# Patient Record
Sex: FEMALE | Race: WHITE | NOT HISPANIC OR LATINO | URBAN - METROPOLITAN AREA
[De-identification: names, ages, dates, MRNs, and addresses within clinical notes are randomized per-mention and may not be internally consistent; named-entity substitution may affect disease eponyms.]

---
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
## 2025-01-31 NOTE — PATIENT INSTRUCTIONS
Patient Education     Lowering Your Risk of Breast Cancer   About this topic   Breast cancer is a serious illness. Breast cancer is when abnormal cells grow and divide more quickly in your breast. These cells form a growth or tumor. The abnormal cells may enter nearby tissue and spread to other parts of the body. It is the type of cancer most often seen in women. Men can have breast cancer, but it is a rare condition.  General   Some things in your life may increase your risk of breast cancer. You may not be able to change some of these. Others you can control.  You are more likely to get breast cancer if you:  Have a mother, sister, or daughter who has had breast cancer  Have used hormones for menopause for more than 5 years  Have had radiation therapy to the breast or chest in the past  Are overweight or do not exercise  Had your first menstrual period before you were 11 years old  Went through menopause after age 55  Have never been pregnant or had your first child after age 35  Have had breast cancer before  Drink alcohol in any form  Have dense breasts  Are older in age  There is no certain way to prevent breast cancer. There are things you can do to lower your chances of having breast cancer.  Keep a healthy weight. Lose weight if you are overweight. Being overweight raises your chances of having breast cancer.  Eat a healthy diet to maintain a healthy weight, such as more fruits, vegetables, and lean cuts of meat. Decrease the amount of saturated fat in your diet.  Exercise. Being active helps you keep a healthy weight.  Limit your alcohol intake or do not drink alcohol. The more alcohol you drink, the higher your risk.  Do not smoke cigarettes. Smoking can increase your risk of many types of cancer.  Breastfeed your baby. This may help protect you. The longer you breastfeed, the more protection you have.  Talk with your doctor about:  Limiting or stopping hormone therapy.  Taking certain drugs to prevent  breast cancer. For women at high risk of having breast cancer, there are a few drugs that may lower your risk.  Surgery to prevent you from having breast cancer if you are very high risk.  When do I need to call the doctor?   Changes in your breasts  A lump or area in your breast that feels different  Discharge from your nipple  Skin on your breast is dimpled or indented  You have questions or concerns about your breasts  Helpful tips   Talk to your doctor about the best kind of breast cancer screening for you.  If you want to do self breast exams, have your doctor show you the right way to do them.  Tell your doctor of any abnormal finding.  Last Reviewed Date   2021-10-04  Consumer Information Use and Disclaimer   This generalized information is a limited summary of diagnosis, treatment, and/or medication information. It is not meant to be comprehensive and should be used as a tool to help the user understand and/or assess potential diagnostic and treatment options. It does NOT include all information about conditions, treatments, medications, side effects, or risks that may apply to a specific patient. It is not intended to be medical advice or a substitute for the medical advice, diagnosis, or treatment of a health care provider based on the health care provider's examination and assessment of a patient’s specific and unique circumstances. Patients must speak with a health care provider for complete information about their health, medical questions, and treatment options, including any risks or benefits regarding use of medications. This information does not endorse any treatments or medications as safe, effective, or approved for treating a specific patient. [employer] and its affiliates disclaim any warranty or liability relating to this information or the use thereof. The use of this information is governed by the Terms of Use, available at  https://www.[employer].com/en/know/clinical-effectiveness-terms   Copyright   Copyright © 2024 [employer] and its affiliates and/or licensors. All rights reserved.       Perineal Hygiene      Your vaginal naturally takes care of its self, it is a self washing system, the less you mess the healthier it will be     No soaps or feminine wash to the vulva, these products can cause dermitis, bacterial infections and other vulvar problems.   Use only water to cleanse, or water with Dove or Dove Sensitive Skin Bar soap if necessary.    Avoid the use of washcloths, exfoliating allison's, netted scrubbers, loofa's, use your hands only to cleanse the vulvar tissues    No scented lotions or products are advised in or near your vulva.    Use coconut oil in its solid form for moisture if needed.  No douching this may cause imbalance in your vaginal PH and further issues.    If you wear panty liners, you may apply a thin coating of Vaseline, A&D ointment or coconut oil to the vulvar tissues as a skin barrier     Cotton underware, loose fitting clothing  Only perfume-free, dye-free laundry detergent, use a second rinse cycle   Avoid fabric softeners/dryer sheets.       Your partner should avoid the same products as well.       Over the counter probiotic to restore vaginal jalil may be helpful as well, take daily.       You may also look into Boric Acid vaginal suppositories to restore vaginal PH balance for up to 2 weeks as directed on the box. You may not use these if you are pregnant      For vaginal dryness:      You may use:     Coconut oil in solid form, not liquid (organic, pure, unscented) as needed for moisture or lubrication. ( Do not use if allergic)       Replens moisture restore external comfort gel daily ( use as directed on the box)        Replens long lasting vaginal moisturizer  ( use as directed on the box)     May try Frockadvisor vulvar moisturizer ( found on Amazon )    May try Revaree vaginal inserts        For  Vaginal Lubrication:          You may use:     Coconut oil in solid form, not liquid (organic, pure, unscented) as a lubricant or another scent-free lubricant (Astroglide, Uberlube) if needed.  Do not use coconut oil or silicone if using a condom as this may break down the integrity of the condom and cause an unplanned pregnancy              Do not use coconut oil if allergic               Replens silky smooth lubricant, premium silicone based lubricant for intercourse. ( use as directed, a small amount will provide an enhanced natural feeling)     Any premium over the counter vaginal lubricant water or silicone based. Silicone based will have more staying power.        For Vulvar irritation/itching:        You may use Hydrocortisone 1 % over the counter to external vulvar tissues 2 x daily for 5-7 days to help with irriation and itch relief.  Birth Control Pills     Birth control pills  are also called oral contraceptives, or the pill. It is medicine that helps prevent pregnancy by stopping ovulation. Ovulation is when the ovaries make and release an egg cell each month. If this egg gets fertilized by sperm, pregnancy occurs. You will need to take the pill at the same time every day. Your healthcare provider will tell you when to start taking the pill. You will also be told what to do if you miss a dose. Instructions will depend on the kind of birth control pills you are taking.   Different kinds of birth control pills:  Some kinds are taken for 21 days in a row, followed by 7 days of placebo (no hormones) pills. Other kinds are taken for 24 days followed by 4 days of placebos. Each kind has a certain amount of female hormones. Your provider will decide on the kind that is best for you based on your age and other health conditions.  Call your local emergency number (911 in the [place]) for any of the following:   You have any of the following signs of a stroke:      Numbness or drooping on one side of your face      Weakness in an arm or leg    Confusion or difficulty speaking    Dizziness, a severe headache, or vision loss    You feel lightheaded, short of breath, and have chest pain.    You cough up blood.    Seek care immediately if:   Your arm or leg feels warm, tender, and painful. It may look swollen and red.    You have severe pain, numbness, or swelling in your arms or legs.    Contact your healthcare provider if:   You have forgotten to take a birth control pill.    You have mood changes, such as depression, since starting birth control pills.    You have nausea or are vomiting.    You have severe abdominal pain.    You missed a period and have questions or concerns about being pregnant.    You still have bleeding 4 months after taking birth control pills correctly.    You have questions or concerns about your condition or care.    What may be done before you can start taking birth control pills:  You need to see your healthcare provider to get a prescription. Any of the following may be done before your healthcare provider gives you a prescription:  Your healthcare provider will ask about diseases and illnesses you have had in the past. Your provider will check your risk for blood clots, heart conditions, or stroke. Tell your provider if you had gastric bypass surgery. This surgery can affect the way your body absorbs medicines such as birth control pills.    Your provider will also check your blood pressure, and may do a breast and pelvic exam. A Pap smear may also be done during the pelvic exam. This is a test to make sure you do not have abnormal changes on your cervix. You may need other tests, such as a urine test to make sure you are not pregnant.    Your provider will ask if you take any medicines and if you smoke. Smoking increases your risk for stroke, heart attack, or a blood clot in your lungs. If you smoke, you should not take certain kinds of birth control pills.    Advantages of birth control pills:   When birth control pills are used correctly, the chances of getting pregnant are very low. Birth control pills may help decrease bleeding and pain during your monthly period. They may also help prevent cancer of the uterus and ovaries.  Disadvantages of birth control pills:  You may have sudden changes in your mood or feelings while you take birth control pills. You may have nausea and a decreased sex drive. You may have an increased appetite and rapid weight gain. You may also have bleeding in between periods, less frequent periods, vaginal dryness, and breast pain. Birth control pills will not protect you from sexually transmitted infections. Rarely, some birth control pills can increase your risk for a blood clot. This may become life-threatening.  If you decide you want to get pregnant:  If you are planning to have a baby, ask your healthcare provider when you may stop taking your birth control pills. It may take some time for you to start ovulating again. Ask your healthcare provider for more information about pregnancy after birth control pills.  When to start taking birth control pills after you have a baby:  If you are not breastfeeding, you may start taking birth control pills 3 weeks after you give birth. You may be able to take certain types of birth control pills if you are breastfeeding. These pills can be started from 6 weeks to 6 months after you give birth. Ask your healthcare provider for more information about when to start taking birth control pills after you give birth.  What you need to know about birth control pills and menopause:   Talk with your healthcare provider if you want to take birth control pills around menopause.     Around age 45, you will enter into perimenopause. This means your hormone levels are dropping and you are ovulating less often. You can still become pregnant during this time. The risk for problems, such as miscarriage, are higher if you become pregnant after age 45.  Birth control pills will prevent pregnancy, and may also help prevent or relieve some signs and symptoms of menopause. Examples are hot flashes and mood swings.     Your provider will do tests when you are around age 50. The tests may show that you are in menopause. If the tests do not show menopause for sure, you may be able to continue taking the pill up to age 55. The decision will depend on your health and if you have any medical conditions, such as a blood clot.    Do not smoke:  Nicotine and other chemicals in cigarettes and cigars increase your risk for a blood clot while you use birth control pills. The risk is higher if you are also 35 or older. Ask your healthcare provider for information if you currently smoke and need help to quit. E-cigarettes or smokeless tobacco still contain nicotine. Talk to your healthcare provider before you use these products.  Follow up with your healthcare provider as directed:  Write down your questions so you remember to ask them during your visits.  © Copyright [employer] 2020 Information is for End User's use only and may not be sold, redistributed or otherwise used for commercial purposes. All illustrations and images included in CareNotes® are the copyrighted property of [employer]. or [employer]  The above information is an  only. It is not intended as medical advice for individual conditions or treatments. Talk to your doctor, nurse or pharmacist before following any medical regimen to see if it is safe and effective for you.     Patient Education     Being transgender   The Basics   Written by the doctors and editors at [facility]   What are sex and gender identity? -- This can be confusing. The terms do not mean exactly the same thing:   Your sex refers to your body's biology. It includes all of the things that are related to reproduction: your genitals, reproductive organs, sex chromosomes, sex hormones, and certain genes. The term  "\"birth-recorded sex\" is used to mean a person's sex when they are born, based on all of these things.   Your gender identity is based on how you feel. It is your sense of being a man, woman, a combination, or something else. Many people whose birth-recorded sex is male identify as men, and many people whose birth-recorded sex is female identify as women. This is called being \"cisgender.\" But not everyone's gender identity lines up with their birth-recorded sex in this way.  What does it mean to be transgender? -- \"Transgender,\" or \"trans,\" is a word some people use to describe themselves when their gender identity is different from their birth-recorded sex.   A transgender woman is a person whose birth-recorded sex is male, but who identifies as a woman.   A transgender man is a person whose birth-recorded sex is female, but who identifies as a man.   Some people do not identify as being either a man or a woman, or identify as being somewhere between the 2. They might prefer another term like \"nonbinary,\" \"genderqueer,\" or \"genderfluid\" to describe themselves. There are many other terms people use to describe their gender identity, too.  Many transgender people want their appearance or \"gender expression\" to match their gender identity, although this is not always the case. Transgender people might choose to take medicines and/or have surgery to change their bodies to better reflect their gender identity. Some people also choose to change the pronouns they use, like \"he,\" \"she,\" or \"they.\"  Gender identity is not the same as sexual orientation. Sexual orientation refers to your physical or sexual attraction to other people. No matter what their gender identity is, a person could be attracted to people of the same sex, different sex, neither, or both.  How do I know if I am transgender? -- Transgender people feel that their gender identity does not match their birth-recorded sex. Doctors sometimes use the term " "\"gender incongruence\" for this. Some use the term \"gender dysphoria\" when this feeling causes distress or discomfort.  Many transgender adults felt this way as children, even before they went through puberty. Puberty is the term for the changes the body goes through during the preteen and teen years.  If you think that you might be transgender, a doctor, nurse, or therapist can help you understand what this means. It's a good idea to talk with a doctor or other professional who has experience working with transgender people. They can talk to you about your options and help you get the support you need.  If you are feeling very sad or anxious, tell a doctor or nurse right away. There are treatments that can help.  What if I think that my child might be transgender? -- If your child shows signs that their gender identity might not match their birth-recorded sex, you can help by being accepting and supportive. Some children and young teens might express a different gender identity in some ways, or for a short time, but not actually be transgender. Others are indeed transgender. The best thing you can do is make it clear that you support your child no matter how they identify.  Working with doctors and other professionals who understand transgender issues can help both you and your child. Remember that being transgender is not a mental illness and that every person is different. For example, some children might want to change the way they dress or use a new name or pronouns, while others do not.  What is gender-affirming care? -- This refers to health care that is meant to help a person align their body with their gender identity in the way that feels best to them. It might include therapy, medicines, or surgery. Different people choose different treatments.  For children or teens, doctors can prescribe medicines that stop or delay puberty. This might be an option if a child is starting puberty and thinks that they " "might be transgender. Stopping the body changes that happen during puberty can give the child more time to figure out if they are truly transgender. If not, they can stop taking the medicines and go through puberty as usual. If they are indeed transgender, they can instead choose to start hormone treatment. This usually happens around age 16, but can be earlier in some cases. For people who also choose to have surgery, doctors often recommend waiting until age 18.  Gender-affirming care should involve doctors, nurses, and others who have experience working with transgender people. It might include:   Therapy with a counselor - This involves talking with an expert like a psychiatrist, psychologist, nurse, or . Therapy can help you understand your gender identity and figure out what changes you might want to make. They might also have you speak with other transgender people, so you can talk to someone who has gone through something similar. This can help you get a better idea of what you might experience as your body changes with hormone treatment and with surgery, if you choose these treatments.   Social transition - This means living according to your gender identity, both at home and in public. This might include wearing clothes or hair styles often associated with this gender identity. It might also include using a name and pronouns (like \"he,\" \"she,\" or \"they\") that fit with your gender identity. If you or your child wants to try a social transition, talk with a counselor or other expert. They can help you figure out how to do this.   Hormone therapy - These include both sex hormones and medicines that block sex hormones. They are taken as pills, injections (shots), or gels you rub on your skin.  Sex hormones cause changes in the sexual organs and other parts of the body. Adding certain hormones, or using medicine to block other hormones, can cause changes in the body. Male hormones are called " "\"androgens,\" and female hormones are called \"estrogens.\"   Transgender women - Transgender women (whose birth-recorded sex is male) can take medicines that block androgens. They can also take estrogen. Both of these things can help lessen body hair and make the body appear more feminine.   Transgender men - Transgender men (whose birth-recorded sex is female) can take androgens. The main androgen that people take is testosterone. Testosterone causes hair to grow on the face and increases muscle. It also makes the clitoris grow larger (figure 1). (The clitoris is the structure above the vagina that gives sexual pleasure.) In addition to making the body look more masculine, testosterone also eventually stops monthly periods.   Surgery - Not all transgender people choose to have surgery. But some do. There are several different surgery options for different parts of the body. These are called \"gender confirmation surgeries\" or \"gender-affirming surgeries.\" Different people choose different surgeries based on what changes they want.  There are 3 types of surgery that transgender women might consider:   Surgeries on the face and neck to create a more feminine appearance - Doctors call these \"facial feminization\" surgeries.   Surgery to make the breasts larger   Genital surgery - Doctors call this \"vaginoplasty.\" This is a surgery to create a vagina. The surgeon uses the skin of the penis to line the inside of the new vagina, and tissue from the tip of the penis to serve as the clitoris. The surgeon removes the testicles and uses the skin of the scrotum (figure 2) to create the folds of tissue around the vagina (called \"labia\").  There are 3 types of surgery that transgender men might consider:   Surgery to remove breast tissue and make the chest look more masculine. This is often referred to as \"top surgery.\"   Surgeries to remove the uterus and/or ovaries   Genital surgery - Some people have surgery to make the clitoris " "longer (called \"metoidioplasty\") or to create a penis (called \"phalloplasty\"). The surgeon might use tissue from the person's arm or leg to create a penis. They can also lengthen the urethra, which is the tube that carries urine from the bladder to outside of the body. This allows the person to urinate standing up if they want to. The labia can be used to create a scrotum. Small plastic balls can be put inside the scrotum to make testicles.  If you are considering surgery, your doctor can talk to you about your options, what to expect with each procedure, and the risks.  Do transgender people need other types of medical care? -- Yes, if they take hormone therapy. That's because some hormones might make a person slightly more likely to have certain medical conditions, including dangerous blood clots and heart disease. In general, transgender people who take hormone therapy should expect to see their doctor every 3 months for the first year of treatment, then 1 or 2 times a year after that. The doctor can test the person's hormone levels and check for any medical problems.  Some types of care, like cancer screenings, are based on what body parts you have, no matter what your gender identity is. For example, if you have a prostate, you might need screening for prostate cancer, even if you are a transgender woman. If you have breasts, you might need screening for breast cancer, even if you are a transgender man.  Having a doctor whom you trust is an important part of making sure that you get the health care you need.  Can transgender people have children? -- The hormone treatments transgender people get can affect their fertility. \"Fertility\" means the ability to get pregnant, or get a partner pregnant. If a person has surgery to remove their ovaries, uterus, or testicles, they will not be able to get pregnant or get a partner pregnant.  When deciding about treatment, it's important to think about whether you might want " "to have children in the future. Some people choose to freeze sperm or eggs in case they want to use them later. For example:   Transgender women (whose birth-recorded sex is male) can try \"sperm banking.\" This involves collecting sperm before you start hormone treatment. The sperm are frozen and stored until you choose to use them.   Transgender men (whose birth-recorded sex is female) can try freezing their eggs before starting hormone treatment. The frozen eggs are stored until you choose to use them.  There are other ways to start a family, too. Some people choose to adopt a baby or child. Others use sperm or eggs that came from other people (called \"donor\" sperm or eggs).  All topics are updated as new evidence becomes available and our peer review process is complete.  This topic retrieved from [employer] on: Feb 26, 2024.  Topic 60978 Version 13.0  Release: 32.2.4 - C32.56  © 2024 UpToDate, Inc. and/or its affiliates. All rights reserved.  figure 1: Adult female external genitalia     This drawing shows the different parts of the genitals.  Graphic 18671 Version 9.0  figure 2: Male reproductive anatomy     The male reproductive system includes the seminal vesicle, prostate gland, vas deferens, urethra, penis, epididymis, testicles, and scrotum.  Graphic 25913 Version 8.0  Consumer Information Use and Disclaimer   Disclaimer: This generalized information is a limited summary of diagnosis, treatment, and/or medication information. It is not meant to be comprehensive and should be used as a tool to help the user understand and/or assess potential diagnostic and treatment options. It does NOT include all information about conditions, treatments, medications, side effects, or risks that may apply to a specific patient. It is not intended to be medical advice or a substitute for the medical advice, diagnosis, or treatment of a health care provider based on the health care provider's examination and assessment of a " patient's specific and unique circumstances. Patients must speak with a health care provider for complete information about their health, medical questions, and treatment options, including any risks or benefits regarding use of medications. This information does not endorse any treatments or medications as safe, effective, or approved for treating a specific patient. UpToDate, Inc. and its affiliates disclaim any warranty or liability relating to this information or the use thereof.The use of this information is governed by the Terms of Use, available at https://www.woltersYour Policy Manageruwer.com/en/know/clinical-effectiveness-terms. 2024© UpToDate, Inc. and its affiliates and/or licensors. All rights reserved.  Copyright   © 2024 UpToDate, Inc. and/or its affiliates. All rights reserved.

## 2025-01-31 NOTE — PROGRESS NOTES
Diagnoses and all orders for this visit:    Encounter for gynecological examination without abnormal finding    Routine health maintenance  -     Ambulatory Referral to Obstetrics / Gynecology    Transgender  -     Ambulatory referral to Obstetrics / Gynecology; Future    Menstrual disorder  -     norethindrone (MICRONOR) 0.35 MG tablet; Take 1 tablet (0.35 mg total) by mouth daily      He would like to start on OCP, Medical and family hx reviewed for risk of VTE. Discussed benefits, side effects and risks of OCP use. Reviewed ACHES. Printed information given & reviewed instructions for how to take the pill .  RTO in 3 months for pill check     Perineal hygiene reviewed   Weight bearing exercises minium of 150 mins/weekly advised.   Kegel exercises recommended.   Reviewed safe sex practices, Condoms encouraged with all sexual activity to prevent STI's  SBE encouraged, ASCCP guidelines reviewed will start Pap's at age 21.   Gardisil vaccines recommended up to age 45  Calcium/ Vit D dietary requirements discussed,   Advised to call with any issues,  all concerns & questions addressed.   See after visit summary for further information and recommendations to the above mentioned subjects which we may or may not have covered in detail during your visit   F/U Annually and PRN      Health Maintenance:    Gardisil: Completed     Subjective    CC: Yearly Exam      Greta Shirley is a 19 y.o. female transgender to Male here for an annual exam.   GYN hx includes:  Transgender female to male, has not been on Testerone therapy yet, would like to go on hormones for transition. May want Top surgery in the future  Hx of Depo for menstrual suppression no longer using , would like to go on POP's  Family hx of:  No GYN cancers  Medically stable, reports no changes in medical Hx, follows with PMD    No LMP recorded (exact date).  His menstrual cycles are regular every 28-30 days. Has hx of heavy bleeding during menses. Bleeding  patterns will vary from month to jluis, when on Depo would only have 3 days of bleeding which was acceptable, no longer wants to be on Depo  He denies breast concerns, abnormal vaginal discharge, vaginal itching, odor, irritation, bowel/bladder dysfunction, urinary symptoms, pelvic pain, or dyspareunia today.   He is not sexually active.     He  does not want STD testing today.    Lives with his adoptive parents, has a twin brother    Safe in environment   Working at [employer] right now   Would like to go to "[employer] school       Past Medical History:   Diagnosis Date    Asthma      History reviewed. No pertinent surgical history.    Immunization History   Administered Date(s) Administered    COVID-19 PFIZER VACCINE 0.3 ML IM 06/15/2021, 07/06/2021    DTaP 02/13/2007    DTaP / Hep B / IPV 2005, 2005, 02/21/2006    DTaP / IPV 05/19/2010    HPV9 06/17/2019, 10/20/2020    Hep A, ped/adol, 2 dose 06/19/2006, 02/13/2007    Hib (PRP-T) 2005, 2005, 02/21/2006    INFLUENZA 10/20/2020, 12/16/2021    Influenza, seasonal, injectable 08/29/2011    MMR 06/19/2006, 05/19/2010    Meningococcal B, OMV (BEXSERO) 12/16/2021    Meningococcal Conjugate (MCV4O) 12/14/2016, 12/16/2021    Meningococcal MCV4P 12/14/2016    Pneumococcal Conjugate PCV 7 2005, 2005, 02/21/2006, 06/19/2006    Tdap 12/14/2016    Tuberculin Skin Test-PPD Intradermal 06/17/2019    Varicella 06/19/2006, 05/19/2010       Family History   Adopted: Yes   Problem Relation Age of Onset    Breast cancer Neg Hx     Colon cancer Neg Hx     Ovarian cancer Neg Hx      Social History     Tobacco Use    Smoking status: Some Days     Types: Cigarettes    Smokeless tobacco: Never   Vaping Use    Vaping status: Never Used   Substance Use Topics    Alcohol use: Never    Drug use: Yes     Types: Marijuana     Comment: sometimes       Current Outpatient Medications:     norethindrone (MICRONOR) 0.35 MG tablet, Take 1 tablet (0.35 mg total) by mouth  "daily, Disp: 90 tablet, Rfl: 3  Patient Active Problem List    Diagnosis Date Noted    Dysmenorrhea in adolescent 2023    Asthma exacerbation 2021    Family circumstance 2021    Gender dysphoria of adolescence 2021    Foster care child 2021    Moderate depressive disorder 10/20/2020    Foster care (status) 2019    Asthma, mild persistent 10/20/2015    Passive smoke exposure 10/20/2015    Learning difficulty 10/20/2015    Low birth weight or  infant, 0548-5017 grams 2005       Allergies   Allergen Reactions    Dog Epithelium (Canis Lupus Familiaris) Hives    Amoxicillin Hives     Other reaction(s): Unknown    Penicillin G Hives     Other reaction(s): Unknown       OB History    Para Term  AB Living   0 0 0 0 0 0   SAB IAB Ectopic Multiple Live Births   0 0 0 0 0       Vitals:    25 1253   BP: 122/70   BP Location: Right arm   Patient Position: Sitting   Cuff Size: Large   Height: 5' 4\" (1.626 m)     Body mass index is 29.18 kg/m².    Review of Systems     Constitutional: Negative for chills, fatigue, fever, headaches, visual disturbances, and unexpected weight change.   Respiratory: Negative for cough, & shortness of breath.  Cardiovascular: Negative for chest pain. .    Gastrointestinal: Negative for Abd pain, nausea & vomiting, constipation and diarrhea.   Genitourinary: Negative for difficulty urinating, dysuria, hematuria, dyspareunia, unusual vaginal bleeding or discharge  Skin: Negative skin changes    Physical Exam     Constitutional: Alert & Oriented x3, well-developed and well-nourished. No distress.   HENT: Atraumatic, Normocephalic, Conjunctivae clear  Neck: Normal range of motion. Neck supple. No thyromegaly, mass, nodules or tenderness  Pulmonary: Effort normal.   Abdominal: Soft. No tenderness or masses  Musculoskeletal: Normal ROM  Skin: Warm & Dry  Psychological: Normal mood, thought content, behavior & judgement     Breasts: "   Right: tissue soft without masses, tenderness, skin changes or nipple discharge. No areas of erythema or pain. No subclavicular, axillary, pectoral adenopathy  Left:  tissue soft without masses, tenderness, skin changes or nipple discharge. No areas of erythema or pain. No subclavicular, axillary, pectoral adenopathy    Pelvic exam was declined today

## 2025-02-04 ENCOUNTER — OFFICE VISIT (OUTPATIENT)
Dept: OBGYN CLINIC | Facility: CLINIC | Age: 20
End: 2025-02-04
Payer: COMMERCIAL

## 2025-02-04 VITALS — DIASTOLIC BLOOD PRESSURE: 70 MMHG | BODY MASS INDEX: 29.18 KG/M2 | SYSTOLIC BLOOD PRESSURE: 122 MMHG | HEIGHT: 64 IN

## 2025-02-04 DIAGNOSIS — Z78.9 TRANSGENDER: ICD-10-CM

## 2025-02-04 DIAGNOSIS — Z01.419 ENCOUNTER FOR GYNECOLOGICAL EXAMINATION WITHOUT ABNORMAL FINDING: Primary | ICD-10-CM

## 2025-02-04 DIAGNOSIS — Z00.00 ROUTINE HEALTH MAINTENANCE: ICD-10-CM

## 2025-02-04 DIAGNOSIS — N92.6 MENSTRUAL DISORDER: ICD-10-CM

## 2025-02-04 PROCEDURE — 99395 PREV VISIT EST AGE 18-39: CPT | Performed by: OBSTETRICS & GYNECOLOGY

## 2025-02-04 RX ORDER — ACETAMINOPHEN AND CODEINE PHOSPHATE 120; 12 MG/5ML; MG/5ML
1 SOLUTION ORAL DAILY
Qty: 90 TABLET | Refills: 3 | Status: SHIPPED | OUTPATIENT
Start: 2025-02-04

## 2025-04-22 NOTE — PROGRESS NOTES
"Diagnoses and all orders for this visit:    Encounter for management of menstrual issue  -     medroxyPROGESTERone (DEPO-PROVERA) 150 mg/mL injection; Inject 1 mL (150 mg total) into a muscle every 3 (three) months    Transgender  -     Ambulatory referral to Obstetrics / Gynecology  -     medroxyPROGESTERone (DEPO-PROVERA) 150 mg/mL injection; Inject 1 mL (150 mg total) into a muscle every 3 (three) months  -     Ambulatory referral to Obstetrics / Gynecology; Future         Depo and schedule for injection   I placed a referral in February for transgender services.  Referral was placed again today as patient has not received a phone call for scheduling    Follow  for annual exams       Subjective    CC: BC follow up      Greta Shirley is a 19 y.o. female   She started on Micronor 2025 for menstrual management,e, reports menstrual cycles to be monthly lasting 4-5 days, flow varies from heavy to light which is not acceptable to her, she forgets to take them for several days at a time      Patient is considering hormone therapy for transgender.  And does not want to get a menstrual cycle. She would like to go back to Depo  We have also discussed the Mirena IUD as an option.  Patient's sister has a Mirena IUD and is happy with that so she may consider that in the future.  She would like to have a consultation in regards to the use of testosterone with progesterone prior to making any further decisions  Patient's last menstrual period was 2025.    Working at [employer]     Family History   Adopted: Yes   Problem Relation Age of Onset    Breast cancer Neg Hx     Colon cancer Neg Hx     Ovarian cancer Neg Hx        Vitals:    25 1250   BP: 124/70   BP Location: Left arm   Patient Position: Sitting   Cuff Size: Large   Weight: 87.5 kg (193 lb)   Height: 5' 4\" (1.626 m)     Body mass index is 33.13 kg/m².    Review of Systems     Constitutional: Negative for chills, fatigue, fever, headaches, visual " disturbances, and unexpected weight change.   Respiratory: Negative for cough, & shortness of breath.  Cardiovascular: Negative for chest pain. .    Gastrointestinal: Negative for Abd pain, nausea & vomiting, constipation and diarrhea.   Genitourinary: Negative for difficulty urinating, dysuria, hematuria, dyspareunia, unusual vaginal bleeding or discharge  Skin: Negative skin changes    Physical Exam     Constitutional: Alert & Oriented x3, well-developed and well-nourished. No distress.   HENT: Atraumatic, Normocephalic,   Neck: Normal range of motion.   Pulmonary: Effort normal.   Abdominal: Soft. No tenderness or masses  Musculoskeletal: Normal ROM  Skin: Warm & Dry  Psychological: Normal mood, thought content, behavior & judgement

## 2025-04-23 ENCOUNTER — OFFICE VISIT (OUTPATIENT)
Dept: OBGYN CLINIC | Facility: CLINIC | Age: 20
End: 2025-04-23
Attending: OBSTETRICS & GYNECOLOGY
Payer: COMMERCIAL

## 2025-04-23 VITALS
SYSTOLIC BLOOD PRESSURE: 124 MMHG | WEIGHT: 193 LBS | HEIGHT: 64 IN | BODY MASS INDEX: 32.95 KG/M2 | DIASTOLIC BLOOD PRESSURE: 70 MMHG

## 2025-04-23 DIAGNOSIS — Z78.9 TRANSGENDER: ICD-10-CM

## 2025-04-23 DIAGNOSIS — N92.6 ENCOUNTER FOR MANAGEMENT OF MENSTRUAL ISSUE: Primary | ICD-10-CM

## 2025-04-23 PROCEDURE — 99213 OFFICE O/P EST LOW 20 MIN: CPT | Performed by: OBSTETRICS & GYNECOLOGY

## 2025-04-23 RX ORDER — MEDROXYPROGESTERONE ACETATE 150 MG/ML
150 INJECTION, SUSPENSION INTRAMUSCULAR
Qty: 1 ML | Refills: 3 | Status: SHIPPED | OUTPATIENT
Start: 2025-04-23

## 2025-04-23 NOTE — PATIENT INSTRUCTIONS
Patient Education     Medroxyprogesterone (me DROKS ee david young)   Brand Names: US Depo-Provera; Depo-SubQ Provera 104; Provera   Brand Names: Aditya Depo-Provera; Provera   Warning   Birth control and endometriosis pain:   Using this drug for birth control or endometriosis pain may cause bone loss. Bone loss is greater the longer the drug is used and may not go back to normal. It is not known what the effects will be on bones when used in teenagers and young adults. Do not use this drug for longer than 2 years unless other options will not work or cannot be used.  Hormone therapy (HT):   Do not use this drug with an estrogen to prevent heart disease or dementia. A study of women taking an estrogen with a progestin showed a raised chance of heart attack, stroke, breast cancer, a blood clot, and dementia.  Use this drug for the shortest time needed at the lowest useful dose. Your doctor will talk with you on a regular basis to see if you need to keep taking this drug.  What is this drug used for?   It is used to prevent pregnancy.  It is used to lower the chance of endometrial changes after menopause in people who are getting estrogen therapy.  It is used to treat pain caused by endometriosis.  It is used to treat uterine bleeding due to hormonal imbalance.  It is used to treat endometrial cancer.  It is used to treat kidney cancer.  It is used to treat people who do not have a monthly period cycle.  It may be given to you for other reasons. Talk with the doctor.  What do I need to tell my doctor BEFORE I take this drug?   If you are allergic to this drug; any part of this drug; or any other drugs, foods, or substances. Tell your doctor about the allergy and what signs you had.  If you have had any of these health problems: Bleeding disorder; blood clots or risk of having a blood clot; breast cancer or other cancer; liver disease; heart attack; stroke; or tumor where estrogen or progesterone make it grow.  If  you have unexplained vaginal bleeding.  If you are pregnant or may be pregnant. Some forms of this drug are not for use during pregnancy.  If you are breast-feeding or plan to breast-feed.  This is not a list of all drugs or health problems that interact with this drug.  Tell your doctor and pharmacist about all of your drugs (prescription or OTC, natural products, vitamins) and health problems. You must check to make sure that it is safe for you to take this drug with all of your drugs and health problems. Do not start, stop, or change the dose of any drug without checking with your doctor.  What are some things I need to know or do while I take this drug?   For all uses of this drug:   Tell all of your health care providers that you take this drug. This includes your doctors, nurses, pharmacists, and dentists. This drug may need to be stopped before certain types of surgery as your doctor has told you. If this drug is stopped, your doctor will tell you when to start taking this drug again after your surgery or procedure.  Blood clots have happened with this drug. These clots have included heart attack, stroke, and clots in the leg, lung, or eye. Sometimes blood clots can be deadly. Tell your doctor if you have ever had a blood clot. Talk with your doctor.  Talk with your doctor if you will need to be still for long periods of time like long trips, bedrest after surgery, or illness. Not moving for long periods may raise your chance of blood clots.  If you have high blood sugar (diabetes), you will need to watch your blood sugar closely.  High triglyceride levels have happened with this drug. Tell your doctor if you have ever had high triglyceride levels.  Have your blood work and bone density checked as you have been told by your doctor.  Take calcium and vitamin D as you were told by your doctor.  There may be a higher chance of breast cancer.  Be sure to have regular breast exams and gynecology check-ups. You  will also need to do breast self-exams as you have been told.  This drug may affect certain lab tests. Tell all of your health care providers and lab workers that you take this drug.  This drug may cause dark patches of skin on your face. Avoid sun, sunlamps, and tanning beds. Use sunscreen and wear clothing and eyewear that protects you from the sun.  If you are 65 or older, use this drug with care. You could have more side effects.  All injection products:   If you will be trying to get pregnant, it may take some time after your last dose of this drug to get pregnant. Talk with your doctor.  Birth control and endometriosis pain:   If you are able to get pregnant, a pregnancy test will be done to show that you are NOT pregnant before starting this drug. Talk with your doctor.  If you get pregnant or have severe stomach pain while using this drug, call your doctor right away. The chance of pregnancy outside of the uterus (ectopic pregnancy) may be higher with this drug.  Birth control:   This drug does not stop the spread of diseases like HIV or hepatitis that are passed through having sex. Do not have any kind of sex without using a latex or polyurethane condom. If you have questions, talk with your doctor.  Certain drugs or herbal products could cause this drug to not work as well. Be sure your doctor and pharmacist know about all of your drugs.  Hormone therapy (HT):   High blood pressure has happened with drugs like this one. Have your blood pressure checked as you have been told by your doctor.  Certain side effects like heart attack, stroke, breast cancer, and others have been seen in some people taking a certain estrogen with a progestin The risk may not be the same for everyone. Factors like how long the treatment is, if an estrogen is taken with or without a progestin, and other factors may affect the risk for certain side effects. Talk with your doctor about the benefits and risks of using this drug.  What  are some side effects that I need to call my doctor about right away?   WARNING/CAUTION: Even though it may be rare, some people may have very bad and sometimes deadly side effects when taking a drug. Tell your doctor or get medical help right away if you have any of the following signs or symptoms that may be related to a very bad side effect:  For all uses of this drug:   Signs of an allergic reaction, like rash; hives; itching; red, swollen, blistered, or peeling skin with or without fever; wheezing; tightness in the chest or throat; trouble breathing, swallowing, or talking; unusual hoarseness; or swelling of the mouth, face, lips, tongue, or throat.  Signs of liver problems like dark urine, tiredness, decreased appetite, upset stomach or stomach pain, light-colored stools, throwing up, or yellow skin or eyes.  Weakness on 1 side of the body, trouble speaking or thinking, change in balance, drooping on one side of the face, or blurred eyesight.  Eyesight changes or loss, bulging eyes, or change in how contact lenses feel.  A lump in the breast, breast pain or soreness, or nipple discharge.  Vaginal itching or discharge.  Vaginal bleeding that is not normal.  Depression or other mood changes.  Seizures.  This drug may cause you to swell or keep fluid in your body. Tell your doctor if you have swelling, weight gain, or trouble breathing.  Call your doctor right away if you have signs of a blood clot like chest pain or pressure; coughing up blood; shortness of breath; swelling, warmth, numbness, change of color, or pain in a leg or arm; or trouble speaking or swallowing.  Cancer treatment:   Signs of a weak adrenal gland like a severe upset stomach or throwing up, severe dizziness or passing out, muscle weakness, feeling very tired, mood changes, decreased appetite, or weight loss.  Signs of Cushing's disease like weight gain in the upper back or belly, moon face, very bad headache, or slow healing.  High calcium  levels have happened with drugs like this one in some people with cancer. Call your doctor right away if you have signs of high calcium levels like weakness, confusion, feeling tired, headache, upset stomach or throwing up, constipation, or bone pain.  Hormone therapy (HT):   Signs of high blood pressure like very bad headache or dizziness, passing out, or change in eyesight.  Signs of gallbladder problems like pain in the upper right belly area, right shoulder area, or between the shoulder blades; yellow skin or eyes; fever with chills; bloating; or very upset stomach or throwing up.  Signs of a pancreas problem (pancreatitis) like very bad stomach pain, very bad back pain, or very bad upset stomach or throwing up.  Signs of low calcium levels like muscle cramps or spasms, numbness and tingling, or seizures.  Memory problems or loss.  Feeling confused.  What are some other side effects of this drug?   All drugs may cause side effects. However, many people have no side effects or only have minor side effects. Call your doctor or get medical help if any of these side effects or any other side effects bother you or do not go away:  For all uses of this drug:   Weight gain or loss.  Headache.  Feeling dizzy, sleepy, tired, or weak.  Feeling nervous and excitable.  Upset stomach or throwing up.  Bloating.  Change in sex interest.  Pimples (acne).  Hair loss.  Hair growth.  Enlarged breasts.  Tender breasts.  Vaginal bleeding or spotting.  Stomach pain.  Trouble sleeping.  No period or other period (menstrual) changes.  All injection products:   Irritation where the shot is given.  Hormone therapy (HT):   Stomach cramps.  Joint pain.  Leg cramps.  These are not all of the side effects that may occur. If you have questions about side effects, call your doctor. Call your doctor for medical advice about side effects.  You may report side effects to your national health agency.  You may report side effects to the FDA at  6-970-654-3109. You may also report side effects at https://www.fda.gov/medwatch.  How is this drug best taken?   Use this drug as ordered by your doctor. Read all information given to you. Follow all instructions closely.  Tablets:   To gain the most benefit, do not miss doses.  Injection:   It is given as a shot into a muscle or into the fatty part of the skin.  What do I do if I miss a dose?   Tablets:   Take a missed dose as soon as you think about it.  If it is close to the time for your next dose, skip the missed dose and go back to your normal time.  Do not take 2 doses at the same time or extra doses.  All injection products:   Call your doctor to find out what to do.  How do I store and/or throw out this drug?   Tablets:   Store at room temperature in a dry place. Do not store in a bathroom.  All injection products:   Most of the time, this drug will be given in a hospital or doctor's office. If stored at home, follow how to store as you were told by the doctor.  All products:   Keep all drugs in a safe place. Keep all drugs out of the reach of children and pets.  Throw away unused or  drugs. Do not flush down a toilet or pour down a drain unless you are told to do so. Check with your pharmacist if you have questions about the best way to throw out drugs. There may be drug take-back programs in your area.  General drug facts   If your symptoms or health problems do not get better or if they become worse, call your doctor.  Do not share your drugs with others and do not take anyone else's drugs.  Some drugs may have another patient information leaflet. If you have any questions about this drug, please talk with your doctor, nurse, pharmacist, or other health care provider.  Some drugs may have another patient information leaflet. Check with your pharmacist. If you have any questions about this drug, please talk with your doctor, nurse, pharmacist, or other health care provider.  If you think there has  been an overdose, call your poison control center or get medical care right away. Be ready to tell or show what was taken, how much, and when it happened.  Consumer Information Use and Disclaimer   This generalized information is a limited summary of diagnosis, treatment, and/or medication information. It is not meant to be comprehensive and should be used as a tool to help the user understand and/or assess potential diagnostic and treatment options. It does NOT include all information about conditions, treatments, medications, side effects, or risks that may apply to a specific patient. It is not intended to be medical advice or a substitute for the medical advice, diagnosis, or treatment of a health care provider based on the health care provider's examination and assessment of a patient's specific and unique circumstances. Patients must speak with a health care provider for complete information about their health, medical questions, and treatment options, including any risks or benefits regarding use of medications. This information does not endorse any treatments or medications as safe, effective, or approved for treating a specific patient. [employer] and its affiliates disclaim any warranty or liability relating to this information or the use thereof. The use of this information is governed by the Terms of Use, available at https://www.[employer]Pixel[employer].com/en/know/clinical-effectiveness-terms.  Last Reviewed Date   2024-03-26  Copyright   © 2024 [employer] and its affiliates and/or licensors. All rights reserved.

## 2025-07-11 ENCOUNTER — TELEPHONE (OUTPATIENT)
Age: 20
End: 2025-07-11

## 2025-07-11 ENCOUNTER — CLINICAL SUPPORT (OUTPATIENT)
Dept: OBGYN CLINIC | Facility: CLINIC | Age: 20
End: 2025-07-11
Payer: COMMERCIAL

## 2025-07-11 DIAGNOSIS — Z30.42 ENCOUNTER FOR DEPO-PROVERA CONTRACEPTION: Primary | ICD-10-CM

## 2025-07-11 LAB — SL AMB POCT URINE HCG: NORMAL

## 2025-07-11 PROCEDURE — 81025 URINE PREGNANCY TEST: CPT

## 2025-07-11 PROCEDURE — 96372 THER/PROPH/DIAG INJ SC/IM: CPT

## 2025-07-11 RX ORDER — HYDROXYZINE HYDROCHLORIDE 25 MG/1
TABLET, FILM COATED ORAL
COMMUNITY
Start: 2025-04-14

## 2025-07-11 RX ORDER — MEDROXYPROGESTERONE ACETATE 150 MG/ML
150 INJECTION, SUSPENSION INTRAMUSCULAR ONCE
Status: COMPLETED | OUTPATIENT
Start: 2025-07-11 | End: 2025-07-11

## 2025-07-11 RX ADMIN — MEDROXYPROGESTERONE ACETATE 150 MG: 150 INJECTION, SUSPENSION INTRAMUSCULAR at 14:03

## 2025-07-11 NOTE — PROGRESS NOTES
Pt is here for Depo Provera injection. This is his first dose.   His annual exam was on 2/04/2025.  Urine pregnancy test done: Y   Result: Neg  Depo given in [place]  Tolerated well.  Lot [id number] Exp 04/2027  Next dose due Sept 26-Oct 10.   Refill needed no

## 2025-07-11 NOTE — TELEPHONE ENCOUNTER
Patient states [facility] did not have depo ready at pharmacy. Advised rx with 3 refills sent in April. Call pharmacy to authorize refill. Patient verbalized understanding